# Patient Record
Sex: MALE
[De-identification: names, ages, dates, MRNs, and addresses within clinical notes are randomized per-mention and may not be internally consistent; named-entity substitution may affect disease eponyms.]

---

## 2018-01-08 ENCOUNTER — HOSPITAL ENCOUNTER (EMERGENCY)
Dept: HOSPITAL 42 - ED | Age: 28
Discharge: HOME | End: 2018-01-08
Payer: COMMERCIAL

## 2018-01-08 VITALS — OXYGEN SATURATION: 100 % | RESPIRATION RATE: 18 BRPM | TEMPERATURE: 98.2 F

## 2018-01-08 VITALS — HEART RATE: 68 BPM | SYSTOLIC BLOOD PRESSURE: 117 MMHG | DIASTOLIC BLOOD PRESSURE: 68 MMHG

## 2018-01-08 VITALS — BODY MASS INDEX: 22.1 KG/M2
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
## 2018-01-08 DIAGNOSIS — S61.213A: Primary | ICD-10-CM
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
## 2018-01-08 DIAGNOSIS — Z23: ICD-10-CM

## 2018-01-08 NOTE — ED PDOC
Arrival/HPI





- General


Chief Complaint: Abnormal Skin Integrity


Time Seen by Provider: 01/08/18 17:16


Historian: Patient





- History of Present Illness


Narrative History of Present Illness (Text): 





01/08/18 17:16


28 y/o male, no pmh, nkda, c/o lt. hand 3rd digit finger laceration x 2 hour.  

Pt. stated that he was using the knife at home, accidentally sliced the left 

finger 3rd digit, bleeding resolved, no numbness or tingling, no difficulty 

bending or extending, no night sweat, no rash, no numbness or tingling, no 

other medical ro psychological complaints, last tetanus doesn't remember. 





Past Medical History





- Provider Review


Nursing Documentation Reviewed: Yes





- Past History


Past History: No Previous





- Psychiatric


Hx Depression: No


Hx Emotional Abuse: No


Hx Physical Abuse: No


Hx Substance Use: No





- Past Surgical History


Past Surgical History: No Previous





- Suicidal Assessment


Feels Threatened In Home Enviroment: No





Family/Social History





- Physician Review


Nursing Documentation Reviewed: Yes


Family/Social History: Unknown Family HX


Smoking Status: Never Smoked


Hx Alcohol Use: No


Hx Substance Use: No





Allergies/Home Meds


Allergies/Adverse Reactions: 


Allergies





No Known Allergies Allergy (Verified 02/10/13 14:37)


 











Review of Systems





- Review of Systems


Constitutional: absent: Fatigue, Fevers


Eyes: absent: Vision Changes


ENT: absent: Hearing Changes


Respiratory: absent: SOB, Cough


Cardiovascular: absent: Chest Pain


Gastrointestinal: absent: Abdominal Pain, Nausea, Vomiting


Musculoskeletal: absent: Arthralgias, Back Pain


Skin: Laceration.  absent: Rash, Pruritis, Skin Lesions


Neurological: absent: Headache, Dizziness


Psychiatric: absent: Anxiety, Depression





Physical Exam


Vital Signs Reviewed: Yes


Vital Signs











  Temp Pulse Resp BP Pulse Ox


 


 01/08/18 16:56  98.2 F  62  18  139/78  100











Temperature: Afebrile


Blood Pressure: Normal


Pulse: Regular


Respiratory Rate: Normal


Appearance: Positive for: Well-Appearing, Non-Toxic, Comfortable


Pain Distress: Mild


Mental Status: Positive for: Alert and Oriented X 3





- Systems Exam


Head: Present: Atraumatic, Normocephalic


Pupils: Present: PERRL


Extroacular Muscles: Present: EOMI


Conjunctiva: Present: Normal


Mouth: Present: Moist Mucous Membranes


Neck: Present: Normal Range of Motion


Respiratory/Chest: Present: Clear to Auscultation, Good Air Exchange.  No: 

Respiratory Distress, Accessory Muscle Use


Cardiovascular: Present: Regular Rate and Rhythm, Normal S1, S2.  No: Murmurs


Abdomen: Present: Normal Bowel Sounds.  No: Tenderness, Distention, Peritoneal 

Signs


Back: Present: Normal Inspection


Upper Extremity: Present: Normal Inspection, Other (Lt. hand 3rd digit: visible 

approx. 1.25cm superficial to intermediate depth laceration, FROM without 

limitation, sensation intact, motor 5/5, +radial pulse, capillary refill< 2 

seconds, neurovascular intact. ).  No: Cyanosis, Edema


Lower Extremity: Present: Normal Inspection.  No: Edema


Neurological: Present: GCS=15, CN II-XII Intact, Speech Normal


Skin: Present: Warm, Dry, Normal Color.  No: Rashes


Psychiatric: Present: Alert, Oriented x 3, Normal Insight, Normal Concentration





Medical Decision Making


ED Course and Treatment: 





01/08/18 17:24


-tdap


-sensation intact, motor 5/5, wound irrigated with normal saline 1000cc, clean 

with betadine, 1% lidocaine digital block on the lt. hand 3rd digit with 

approx. 2cc, 5-0 nylon made 4 sutures, hemostasis obtained, bacitracin and 

gauze dressing, sensation intact, motor 5/5, less than 5cc of blood loss, 

procedure time 15 minutes.





01/08/18 18:23


-Discharge home with keflex, motrin, keep the dressing dry and clean for 2 days

, sutures need to be removed by day 11-12, return to the ER for any new or 

worsening signs or symptoms. 





- Medication Orders


Current Medication Orders: 











Discontinued Medications





Lidocaine HCl (Lidocaine 1% (20ml))  2 ml IJ STAT STA


   Stop: 01/08/18 17:20


   Last Admin: 01/08/18 17:29 Dose:  Not Given


   Non-Admin Reason: Agitation





Tetanus/Reduced Diphtheria/Acell Pertussis (Boostrix Vaccine Inj)  0.5 ml IM 

.ONCE ONE


   Stop: 01/08/18 17:20


   Last Admin: 01/08/18 17:29  Dose: 0.5 ml





MAR Immunization Data


 Document     01/08/18 17:29  [facility]  (Rec: 01/08/18 17:29  [facility]  [id number])


     Immunization Data


      Vaccine Information Sheet Given            No














- PA / NP / Resident Statement


MD/DO has reviewed & agrees with the documentation as recorded.





Disposition/Present on Arrival





- Present on Arrival


Any Indicators Present on Arrival: No


History of DVT/PE: No


History of Uncontrolled Diabetes: No


Urinary Catheter: No


History of Decub. Ulcer: No


History Surgical Site Infection Following: None





- Disposition


Have Diagnosis and Disposition been Completed?: Yes


Diagnosis: 


 Finger laceration





Disposition: HOME/ ROUTINE


Disposition Time: 18:23


Patient Plan: Discharge


Patient Problems: 


 Current Active Problems











Problem Status Onset


 


Finger laceration Acute  











Condition: GOOD


Discharge Instructions (ExitCare):  Care For Your Stitches (ED)


Print Language: ENGLISH


Additional Instructions: 


-Discharge home with keflex, motrin, keep the dressing dry and clean for 2 days

, sutures need to be removed by day 11-12, return to the ER for any new or 

worsening signs or symptoms. 


Prescriptions: 


Cephalexin [Keflex] 500 mg PO TID #30 capsule


Ibuprofen [Motrin] 600 mg PO QID #30 tab


Referrals: 


Romaine Hough MD [Staff Provider] - Follow up with primary


[facility] [place] - Follow up with primary


Forms:  [facility] (English), WORK NOTE

## 2019-01-19 ENCOUNTER — HOSPITAL ENCOUNTER (EMERGENCY)
Dept: HOSPITAL 14 - H.ER | Age: 29
Discharge: HOME | End: 2019-01-19
Payer: SELF-PAY

## 2019-01-19 VITALS — TEMPERATURE: 97.5 F | RESPIRATION RATE: 18 BRPM

## 2019-01-19 VITALS — OXYGEN SATURATION: 98 % | DIASTOLIC BLOOD PRESSURE: 78 MMHG | SYSTOLIC BLOOD PRESSURE: 116 MMHG

## 2019-01-19 VITALS — HEART RATE: 86 BPM

## 2019-01-19 DIAGNOSIS — F10.129: Primary | ICD-10-CM

## 2019-01-19 NOTE — ED PDOC
HPI: Psych/Substance Abuse


Time Seen by Provider: 01/19/19 01:51


Chief Complaint (Nursing): Alcohol Ingestion


ED Caveat: Intoxicated


History Per: Patient, EMS


Additional Complaint(s): 





As per EMS pt. vomited while at a restaurant today. Pt. admits to drinking 

alcohol today. Offers no complaints. Denies abd pain, injury, fall. 





Past Medical History


Reviewed: Historical Data, Nursing Documentation, Vital Signs


Vital Signs: 





                                Last Vital Signs











Temp  97.5 F L  01/19/19 01:52


 


Pulse  77   01/19/19 01:52


 


Resp  18   01/19/19 01:52


 


BP  108/73   01/19/19 01:52


 


Pulse Ox  97   01/19/19 01:52














- Family History


Family History: States: No Known Family Hx





- Allergies


Allergies/Adverse Reactions: 


                                    Allergies











Allergy/AdvReac Type Severity Reaction Status Date / Time


 


No Known Allergies Allergy   Verified 01/19/19 01:52














Review of Systems


ROS Statement: Except As Marked, All Systems Reviewed And Found Negative


Gastrointestinal: Positive for: Vomiting





Physical Exam





- Reviewed


Nursing Documentation Reviewed: Yes


Vital Signs Reviewed: Yes





- Physical Exam


Appears: Positive for: Well, Non-toxic, No Acute Distress


Head Exam: Positive for: ATRAUMATIC, NORMAL INSPECTION, NORMOCEPHALIC


Skin: Positive for: Normal Color, Warm.  Negative for: Rash


Eye Exam: Positive for: EOMI, Normal appearance, PERRL


ENT: Positive for: Normal ENT Inspection


Neck: Positive for: Normal, Painless ROM


Cardiovascular/Chest: Positive for: Regular Rate, Rhythm


Respiratory: Positive for: CNT, Normal Breath Sounds


Gastrointestinal/Abdominal: Positive for: Normal Exam, Soft.  Negative for: 

Tenderness


Back: Positive for: Normal Inspection.  Negative for: L CVA Tenderness, R CVA 

Tenderness, Vertebral Tenderness


Extremity: Positive for: Normal ROM


Neurologic/Psych: Positive for: Alert, Other (slurred speech; AOB).  Negative 

for: Aphasia, Facial Droop





- ECG


O2 Sat by Pulse Oximetry: 97





- Progress


ED Course And Treament: 





0545


On re-evaluation, pt. AOx3. Offers no complaints. Abd remains soft and non-

tender. Gait steady, unassisted. No slurred speech. 





Disposition





- Clinical Impression


Clinical Impression: 


 Alcohol intoxication








- Patient ED Disposition


Is Patient to be Admitted: No





- Disposition


Referrals: 


[facility] [Outside]


Disposition: Routine/Home


Disposition Time: 05:50


Condition: IMPROVED


Instructions:  Alcohol Use - When Is Drinking a Problem?


Forms:  [facility] (English)